# Patient Record
Sex: FEMALE
[De-identification: names, ages, dates, MRNs, and addresses within clinical notes are randomized per-mention and may not be internally consistent; named-entity substitution may affect disease eponyms.]

---

## 2018-12-27 ENCOUNTER — HOSPITAL ENCOUNTER (OUTPATIENT)
Dept: HOSPITAL 59 - SUR | Age: 53
Discharge: HOME | End: 2018-12-27
Attending: ORTHOPAEDIC SURGERY
Payer: COMMERCIAL

## 2018-12-27 DIAGNOSIS — M22.41: Primary | ICD-10-CM

## 2018-12-27 DIAGNOSIS — M94.261: ICD-10-CM

## 2018-12-27 PROCEDURE — 01400 ANES OPN/ARTHRS KNEE JT NOS: CPT

## 2018-12-27 PROCEDURE — 29877 ARTHRS KNEE SURG DBRDMT/SHVG: CPT

## 2018-12-28 NOTE — OPERATIVE NOTE
DATE OF SURGERY:  12/27/2018



SURGEON:  ARLYN HILLS D.O.



REFERRING PHYSICIAN:  JULES BRICE D.O.



PREOPERATIVE DIAGNOSES:  

TORN MEDIAL MENISCUS RIGHT KNEE.



POSTOPERATIVE DIAGNOSES:  

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 



OPERATIVE PROCEDURE: 

ARTHROSCOPIC CHONDROPLASTY MEDIAL FEMORAL CONDYLE AND PATELLA RIGHT KNEE.



DESCRIPTION:  This 53-year-old female was taken to the Operating Room and 
placed in the supine position on the operating room table, where general 
anesthesia was induced. The right lower extremity was elevated. It was 
exsanguinated and the tourniquet inflated to 300 mmHg. Arthroscopic knee jaime 
applied. Right knee prepped with Hibiclens and draped in the usual sterile 
fashion.  



An inferior lateral portal was established with a 4 mm arthroscope and initial 
evaluation of the joint demonstrated grade 2 chondromalacia of the medial facet 
and the oblique facet. Through an inferior medial portal, chondroplasty was 
performed to stabilize the articular cartilage there. The trochlea, however, 
appeared to be normal.



The medial compartment was entered and probing of the medial meniscus did not 
reveal any pathology. There appeared to be a lesion in the center of the 
weightbearing surface. It appeared to be relatively small. However, after 
probing, we found that this was actually large flaps of loose articular 
cartilage and chondroplasty was performed to remove the loose and unstable 
fragments. It was then re-probed and confirmed to be stable. 



We then directed our attention to the intercondylar notch and it was found to 
be normal.



The lateral compartment was entered and probing of the lateral compartment 
meniscus and articular cartilage did not reveal any abnormalities. The joint 
was copiously irrigated with lactated ringer solution. It was suctioned. The 
instruments were removed. The portals were infiltrated with 0.25% Marcaine with 
Epinephrine. Sterile dressings applied, tourniquet and knee jaime released, 
and the patient taken to the Recovery Room in satisfactory condition.



GROSS PATHOLOGY:  This patient demonstrated what appeared to be a relatively 
small articular cartilage lesion just slightly lateral to the midline of the 
medial femoral condyle in the center of the weightbearing surface. However, 
once this was probed, we found that this lesion was rather extensive and found 
to cover approximately two-thirds of the articulating surface of the medial 
femoral condyle with grade 2 changes. The patella also demonstrated grade 2 
changes on the medial facet and oblique facet. No meniscal lesions were 
identified.   



JOB NUMBER:  [id number]
[facility]